# Patient Record
Sex: FEMALE
[De-identification: names, ages, dates, MRNs, and addresses within clinical notes are randomized per-mention and may not be internally consistent; named-entity substitution may affect disease eponyms.]

---

## 2019-06-13 ENCOUNTER — HOSPITAL ENCOUNTER (EMERGENCY)
Dept: HOSPITAL 97 - ER | Age: 59
Discharge: HOME | End: 2019-06-13
Payer: COMMERCIAL

## 2019-06-13 VITALS — DIASTOLIC BLOOD PRESSURE: 80 MMHG | OXYGEN SATURATION: 100 % | SYSTOLIC BLOOD PRESSURE: 137 MMHG

## 2019-06-13 VITALS — TEMPERATURE: 97.4 F

## 2019-06-13 DIAGNOSIS — F41.9: ICD-10-CM

## 2019-06-13 DIAGNOSIS — E06.3: ICD-10-CM

## 2019-06-13 DIAGNOSIS — Z88.1: ICD-10-CM

## 2019-06-13 DIAGNOSIS — Z88.5: ICD-10-CM

## 2019-06-13 DIAGNOSIS — E78.5: ICD-10-CM

## 2019-06-13 DIAGNOSIS — F41.0: Primary | ICD-10-CM

## 2019-06-13 LAB
BUN BLD-MCNC: 15 MG/DL (ref 7–18)
GLUCOSE SERPLBLD-MCNC: 104 MG/DL (ref 74–106)
HCT VFR BLD CALC: 39.9 % (ref 36–45)
INR BLD: 1.05
LYMPHOCYTES # SPEC AUTO: 1.8 K/UL (ref 0.7–4.9)
MAGNESIUM SERPL-MCNC: 2.2 MG/DL (ref 1.8–2.4)
NT-PROBNP SERPL-MCNC: 35 PG/ML (ref ?–125)
PMV BLD: 8.5 FL (ref 7.6–11.3)
POTASSIUM SERPL-SCNC: 3.7 MMOL/L (ref 3.5–5.1)
RBC # BLD: 4.68 M/UL (ref 3.86–4.86)
TSH SERPL DL<=0.05 MIU/L-ACNC: 1.72 UIU/ML (ref 0.36–3.74)

## 2019-06-13 PROCEDURE — 99284 EMERGENCY DEPT VISIT MOD MDM: CPT

## 2019-06-13 PROCEDURE — 85610 PROTHROMBIN TIME: CPT

## 2019-06-13 PROCEDURE — 81003 URINALYSIS AUTO W/O SCOPE: CPT

## 2019-06-13 PROCEDURE — 36415 COLL VENOUS BLD VENIPUNCTURE: CPT

## 2019-06-13 PROCEDURE — 80048 BASIC METABOLIC PNL TOTAL CA: CPT

## 2019-06-13 PROCEDURE — 84439 ASSAY OF FREE THYROXINE: CPT

## 2019-06-13 PROCEDURE — 71045 X-RAY EXAM CHEST 1 VIEW: CPT

## 2019-06-13 PROCEDURE — 85025 COMPLETE CBC W/AUTO DIFF WBC: CPT

## 2019-06-13 PROCEDURE — 85379 FIBRIN DEGRADATION QUANT: CPT

## 2019-06-13 PROCEDURE — 84443 ASSAY THYROID STIM HORMONE: CPT

## 2019-06-13 PROCEDURE — 96374 THER/PROPH/DIAG INJ IV PUSH: CPT

## 2019-06-13 PROCEDURE — 83735 ASSAY OF MAGNESIUM: CPT

## 2019-06-13 PROCEDURE — 93005 ELECTROCARDIOGRAM TRACING: CPT

## 2019-06-13 PROCEDURE — 83880 ASSAY OF NATRIURETIC PEPTIDE: CPT

## 2019-06-13 NOTE — ER
Nurse's Notes                                                                                     

 [facility]                                                                 

Name: Selena Abrams                                                                             

Age: 59 yrs                                                                                       

Sex: Female                                                                                       

: 1960                                                                                   

MRN: [id number]                                                                                   

Arrival Date: 2019                                                                          

Time: 16:03                                                                                       

Account#: [id number]                                                                            

Bed 8                                                                                             

Private MD:                                                                                       

Diagnosis: Panic disorder [episodic paroxysmal anxiety] without agoraphobia                       

                                                                                                  

Presentation:                                                                                     

                                                                                             

16:10 Presenting complaint: Patient states: Three weeks ago hit my chest hard, it was checked ch  

      and everything is ok according to my Doctor. I have had pain since. Today I feel like       

      my heart is racing, my jaw hurts, chest pain. I took 1/2 a xanax and it didn't help.        

      Transition of care: patient was not received from another setting of care. Onset of         

      symptoms. Risk Assessment: Do you want to hurt yourself or someone else? Patient            

      reports no desire to harm self or others. Initial Sepsis Screen: Does the patient meet      

      any 2 criteria? No. Patient's initial sepsis screen is negative. Does the patient have      

      a suspected source of infection? No. Patient's initial sepsis screen is negative. Care      

      prior to arrival: None.                                                                     

16:10 Method Of Arrival: Ambulatory                                                             

16:10 Acuity: JEFERSON 3                                                                           ch  

                                                                                                  

Triage Assessment:                                                                                

16:14 General: Appears uncomfortable, Behavior is anxious.                                      

                                                                                                  

Historical:                                                                                       

- Allergies:                                                                                      

16:14 Codeine;                                                                                  

16:14 Levofloxacin;                                                                             

16:14 Cipro PO;                                                                                 

- Home Meds:                                                                                      

16:14 Xanax 0.5 mg Oral tab 1 tab [Active]; [place] Thyroid Oral [Active]; pravastatin 40 mg   ch  

      Oral tab 1 tab once daily [Active];                                                         

- PMHx:                                                                                           

16:14 Hashimoto's disease; Hyperlipidemia; osteoarthritis; Anxiety;                           ch  

- PSHx:                                                                                           

16:14 knee L; skin graft and jessica fx to legs and collar bone; ectopic, one fallopian tube and  ch  

      ovary removed.;                                                                             

                                                                                                  

- Immunization history:: Adult Immunizations up to date.                                          

- Social history:: Smoking status: Patient/guardian denies using tobacco.                         

- Ebola Screening: : Patient negative for fever greater than or equal to 101.5 degrees            

  Fahrenheit, and additional compatible Ebola Virus Disease symptoms Patient denies               

  exposure to infectious person Patient denies travel to an Ebola-affected area in the            

  21 days before illness onset No symptoms or risks identified at this time.                      

                                                                                                  

                                                                                                  

Screenin:52 Abuse screen: Denies threats or abuse. Denies injuries from another. Nutritional        hb  

      screening: No deficits noted. Tuberculosis screening: No symptoms or risk factors           

      identified. Fall Risk None identified.                                                      

                                                                                                  

Assessment:                                                                                       

16:32 General: Appears in no apparent distress. Behavior is cooperative, anxious, crying.     hb  

      Pain: Pain currently is 8 out of 10 on a pain scale. Neuro: Level of Consciousness is       

      awake, alert, obeys commands, Oriented to person, place, time, situation.                   

      Cardiovascular: Capillary refill < 3 seconds Patient's skin is warm and dry.                

      Respiratory: Airway is patent Respiratory effort is even, unlabored, Respiratory            

      pattern is regular, symmetrical. GI: No signs and/or symptoms were reported involving       

      the gastrointestinal system. : No signs and/or symptoms were reported regarding the       

      genitourinary system. EENT: No signs and/or symptoms were reported regarding the EENT       

      system. Derm: Skin is intact, is healthy with good turgor, Skin is pink, warm \T\ dry.      

      Musculoskeletal: No signs and/or symptoms reported regarding the musculoskeletal system.    

16:54 Reassessment: Pt ambulated to bathroom with steady gait, urine specimen provided.       hb  

      Assisted back to med, on monitor. Bed locked in low position, call light within reach.      

17:31 Reassessment: Patient appears in no apparent distress at this time. Patient and/or      hb  

      family updated on plan of care and expected duration. Pain level reassessed. Patient is     

      alert, oriented x 3, equal unlabored respirations, skin warm/dry/pink.                      

18:15 Reassessment: Patient appears in no apparent distress at this time. Patient and/or      hb  

      family updated on plan of care and expected duration. Pain level reassessed. Patient is     

      alert, oriented x 3, equal unlabored respirations, skin warm/dry/pink. Patient states       

      symptoms have improved.                                                                     

                                                                                                  

Vital Signs:                                                                                      

16:14  / 92; Pulse 113; Resp 20; Temp 97.4; Pulse Ox 99% on R/A; Weight 69.4 kg; Height   

      5 ft. 4 in. (162.56 cm); Pain 8/10;                                                         

17:30  / 80; Pulse 85; Resp 14; Pulse Ox 100% on R/A;                                   hb  

16:14 Body Mass Index 26.26 (69.40 kg, 162.56 cm)                                               

                                                                                                  

ED Course:                                                                                        

16:03 Patient arrived in ED.                                                                  mr  

16:11 Triage completed.                                                                         

16:14 Arm band placed on left wrist. Patient placed in an exam room, on a stretcher.          ch  

16:24 Jenna Gregg, RN is Primary Nurse.                                                   hb  

16:27 Edenilson Silvestre PA is PHCP.                                                               [id number] 

16:27 Troy Trimble MD is Attending Physician.                                             [id number] 

16:45 Patient has correct armband on for positive identification. Placed in gown. Bed in low  hb  

      position. Call light in reach. Side rails up X 1. Cardiac monitor on. Pulse ox on. NIBP     

      on.                                                                                         

16:52 Inserted saline lock: 20 gauge in right antecubital area, using aseptic technique.      hb  

      Blood collected.                                                                            

17:16 XRAY Chest (1 view) In Process Unspecified.                                             EDMS

18:15 No provider procedures requiring assistance completed. IV discontinued, intact,         hb  

      bleeding controlled, No redness/swelling at site. Pressure dressing applied.                

                                                                                                  

Administered Medications:                                                                         

17:37 Drug: Ativan 0.5 mg Route: IVP; Site: right antecubital;                                hb  

18:11 Follow up: Response: No adverse reaction; Anxiety decreased                             hb  

                                                                                                  

                                                                                                  

Outcome:                                                                                          

18:02 Discharge ordered by MD.                                                                [id number] 

18:15 Discharged to home ambulatory.                                                          hb  

18:15 Condition: stable                                                                           

18:15 Discharge instructions given to patient, Instructed on discharge instructions, follow       

      up and referral plans. medication usage, Demonstrated understanding of instructions,        

      follow-up care, medications.                                                                

18:27 Patient left the ED.                                                                    sg  

                                                                                                  

Signatures:                                                                                       

Dispatcher [employer]                           EDMS                                                 

Mandie Aguilar RN RN                                                      

Adin Mcneill RN RN                                                      

Kristy Samuels                                 mr                                                   

Edenilson Silvestre PA PA   jr                                                  

Jenna Gregg RN                     RN   hb                                                   

                                                                                                  

**************************************************************************************************

## 2019-06-13 NOTE — RAD REPORT
EXAM DESCRIPTION:  RAD - Chest Single View - 6/13/2019 5:16 pm

 

CLINICAL HISTORY:  DYSPNEA

Chest pain.

 

COMPARISON:  Chest Pa And Lat (2 Views) dated 5/28/2019; Chest Single View dated 12/29/2016; CHEST SI
NGLE VIEW dated 5/26/2012; CHEST PA AND LAT 2 VIEW dated 3/9/2006

 

FINDINGS:  Portable technique limits examination quality.

 

The lungs are grossly clear. The heart is normal in size. No displaced fractures.

 

IMPRESSION:  No acute intrathoracic process suspected.

## 2019-06-13 NOTE — EDPHYS
Physician Documentation                                                                           

 [facility]                                                                 

Name: Selena Abrams                                                                             

Age: 59 yrs                                                                                       

Sex: Female                                                                                       

: 1960                                                                                   

MRN: [id number]                                                                                   

Arrival Date: 2019                                                                          

Time: 16:03                                                                                       

Account#: [id number]                                                                            

Bed 8                                                                                             

Private MD:                                                                                       

ED Physician Troy Trimble                                                                      

HPI:                                                                                              

                                                                                             

16:58 This 59 yrs old  Female presents to ER via Ambulatory with complaints of        jr8 

      Doesn't Feel Right.                                                                         

16:58 Patient stated that she had chest trauma on [place] day and was seen and evaluated     jr8 

      without acute fracture. Stated that she has been in pain since then but has been            

      managing. Today stated that she started to not feel right. Stated that she feels like       

      her whole body is being electrocuted and is anxious. Stated that she has continued          

      chest pain and shortness of breath now . Severity of symptoms: At their worst the           

      symptoms were moderate in the emergency department the symptoms are unchanged. The          

      patient has not experienced similar symptoms in the past. The patient has been recently     

      seen by a physician:.                                                                       

                                                                                                  

Historical:                                                                                       

- Allergies:                                                                                      

16:14 Codeine;                                                                                ch  

16:14 Levofloxacin;                                                                           ch  

16:14 Cipro PO;                                                                               ch  

- Home Meds:                                                                                      

16:14 Xanax 0.5 mg Oral tab 1 tab [Active]; Prentiss Thyroid Oral [Active]; pravastatin 40 mg   ch  

      Oral tab 1 tab once daily [Active];                                                         

- PMHx:                                                                                           

16:14 Hashimoto's disease; Hyperlipidemia; osteoarthritis; Anxiety;                           ch  

- PSHx:                                                                                           

16:14 knee L; skin graft and jessica fx to legs and collar bone; ectopic, one fallopian tube and  ch  

      ovary removed.;                                                                             

                                                                                                  

- Immunization history:: Adult Immunizations up to date.                                          

- Social history:: Smoking status: Patient/guardian denies using tobacco.                         

- Ebola Screening: : Patient negative for fever greater than or equal to 101.5 degrees            

  Fahrenheit, and additional compatible Ebola Virus Disease symptoms Patient denies               

  exposure to infectious person Patient denies travel to an Ebola-affected area in the            

  21 days before illness onset No symptoms or risks identified at this time.                      

                                                                                                  

                                                                                                  

ROS:                                                                                              

16:58 Eyes: Negative for injury, pain, redness, and discharge, ENT: Negative for injury,      jr8 

      pain, and discharge, Neck: Negative for injury, pain, and swelling, Abdomen/GI:             

      Negative for abdominal pain, nausea, vomiting, diarrhea, and constipation, Back:            

      Negative for injury and pain, MS/Extremity: Negative for injury and deformity, Skin:        

      Negative for injury, rash, and discoloration, Neuro: Negative for headache, weakness,       

      numbness, tingling, and seizure.                                                            

16:58 Cardiovascular: Positive for chest pain, with movement.                                     

16:58 Respiratory: Positive for shortness of breath, Negative for cough, dyspnea on exertion,     

      sputum production, wheezing.                                                                

                                                                                                  

Exam:                                                                                             

16:58 Eyes:  Pupils equal round and reactive to light, extra-ocular motions intact.  Lids and jr8 

      lashes normal.  Conjunctiva and sclera are non-icteric and not injected.  Cornea within     

      normal limits.  Periorbital areas with no swelling, redness, or edema. ENT:  Nares          

      patent. No nasal discharge, no septal abnormalities noted.  Tympanic membranes are          

      normal and external auditory canals are clear.  Oropharynx with no redness, swelling,       

      or masses, exudates, or evidence of obstruction, uvula midline.  Mucous membranes           

      moist. Neck:  Trachea midline, no thyromegaly or masses palpated, and no cervical           

      lymphadenopathy.  Supple, full range of motion without nuchal rigidity, or vertebral        

      point tenderness.  No Meningismus. Chest/axilla:  Normal chest wall appearance and          

      motion.  Nontender with no deformity.  No lesions are appreciated. Cardiovascular:          

      Regular rate and rhythm with a normal S1 and S2.  No gallops, murmurs, or rubs.  Normal     

      PMI, no JVD.  No pulse deficits. Respiratory:  Lungs have equal breath sounds               

      bilaterally, clear to auscultation and percussion.  No rales, rhonchi or wheezes noted.     

       No increased work of breathing, no retractions or nasal flaring. Abdomen/GI:  Soft,        

      non-tender, with normal bowel sounds.  No distension or tympany.  No guarding or            

      rebound.  No evidence of tenderness throughout. Back:  No spinal tenderness.  No            

      costovertebral tenderness.  Full range of motion. Skin:  Warm, dry with normal turgor.      

      Normal color with no rashes, no lesions, and no evidence of cellulitis. MS/ Extremity:      

      Pulses equal, no cyanosis.  Neurovascular intact.  Full, normal range of motion. Neuro:     

       Awake and alert, GCS 15, oriented to person, place, time, and situation.  Cranial          

      nerves II-XII grossly intact.  Motor strength 5/5 in all extremities.  Sensory grossly      

      intact.  Cerebellar exam normal.  Normal gait.                                              

16:58 Psych: Behavior/mood is anxious.                                                            

                                                                                                  

Vital Signs:                                                                                      

16:14  / 92; Pulse 113; Resp 20; Temp 97.4; Pulse Ox 99% on R/A; Weight 69.4 kg; Height ch  

      5 ft. 4 in. (162.56 cm); Pain 8/10;                                                         

17:30  / 80; Pulse 85; Resp 14; Pulse Ox 100% on R/A;                                   hb  

16:14 Body Mass Index 26.26 (69.40 kg, 162.56 cm)                                             [facility]:                                                                                              

16:27 Patient medically screened.                                                             jr8 

18:01 Data reviewed: vital signs, nurses notes, lab test result(s), EKG, radiologic studies,  jr8 

      plain films, and as a result, I will discharge patient. Data interpreted: Pulse             

      oximetry: on room air is 100 %. Interpretation: normal. Counseling: I had a detailed        

      discussion with the patient and/or guardian regarding: the historical points, exam          

      findings, and any diagnostic results supporting the discharge/admit diagnosis, lab          

      results, radiology results, the need for outpatient follow up, a family practitioner,       

      to return to the emergency department if symptoms worsen or persist or if there are any     

      questions or concerns that arise at home. Response to treatment: the patient's symptoms     

      have resolved after treatment.                                                              

18:02 ED course: Discussed with patient if she is having increased anxiety even on her benzo, jr8 

      that it is recommended that she get on an SSRI or SNRI combo and then eventually wean       

      off of her Xanax. Patient agreed and would f/u with PCP after the weekend .                 

                                                                                                  

                                                                                             

16:37 Order name: Basic Metabolic Panel; Complete Time: 17:50                                                                                                                              

16:37 Order name: CBC with Diff; Complete Time: 17:07                                                                                                                                      

16:37 Order name: Magnesium; Complete Time: 17:50                                                                                                                                          

16:37 Order name: NT PRO-BNP; Complete Time: 17:50                                                                                                                                         

16:37 Order name: PT-INR; Complete Time: 17:21                                                                                                                                             

16:37 Order name: TSH; Complete Time: 17:50                                                                                                                                                

16:37 Order name: XRAY Chest (1 view); Complete Time: 17:24                                                                                                                                

16:37 Order name: EKG; Complete Time: 16:41                                                                                                                                                

16:37 Order name: Cardiac monitoring; Complete Time: 16:51                                                                                                                                 

16:37 Order name: EKG - Nurse/Tech; Complete Time: 16:51                                                                                                                                   

16:37 Order name: T4 Free; Complete Time: 17:50                                                                                                                                            

16:37 Order name: DD; Complete Time: 17:21                                                                                                                                                 

17:09 Order name: Urine Dipstick--Ancillary (enter results); Complete Time: 18:05             ms  

                                                                                             

16:37 Order name: IV Saline Lock; Complete Time: 16:52                                                                                                                                     

16:37 Order name: Labs collected and sent; Complete Time: 16:52                                                                                                                            

16:37 Order name: O2 Per Protocol; Complete Time: 16:52                                                                                                                                    

16:37 Order name: O2 Sat Monitoring; Complete Time: 16:52                                      

                                                                                                  

Administered Medications:                                                                         

17:37 Drug: Ativan 0.5 mg Route: IVP; Site: right antecubital;                                hb  

18:11 Follow up: Response: No adverse reaction; Anxiety decreased                             hb  

                                                                                                  

                                                                                                  

Disposition:                                                                                      

19 18:02 Discharged to Home. Impression: Panic disorder [episodic paroxysmal anxiety]       

  without agoraphobia.                                                                            

- Condition is Stable.                                                                            

- Discharge Instructions: Panic Attacks.                                                          

                                                                                                  

- Medication Reconciliation Form, Thank You Letter, Antibiotic Education, Prescription            

  Opioid Use form.                                                                                

- Follow up: Private Physician; When: 2 - 3 days; Reason: Recheck today's complaints,             

  Continuance of care, Re-evaluation by your physician.                                           

- Problem is new.                                                                                 

- Symptoms have improved.                                                                         

                                                                                                  

                                                                                                  

                                                                                                  

Addendum:                                                                                         

2019                                                                                        

     09:53 Co-signature as Attending Physician, Troy Trimble MD I agree with the assessment and  c
ha

           plan of care.                                                                          

                                                                                                  

Signatures:                                                                                       

Dispatcher [employer]                           Mandie Weinberg RN                  Adin Calles ch, RN RN sg Anderson, Corey, MD MD cha Roszak, Josh, PA PA   jr8                                                  

Jenna Gregg RN RN                                                      

                                                                                                  

Corrections: (The following items were deleted from the chart)                                    

                                                                                             

18:27 18:02 2019 18:02 Discharged to Home. Impression: Panic disorder [episodic         sg  

      paroxysmal anxiety] without agoraphobia. Condition is Stable. Forms are Medication          

      Reconciliation Form, Thank You Letter, Antibiotic Education, Prescription Opioid Use.       

      Follow up: Private Physician; When: 2 - 3 days; Reason: Recheck today's complaints,         

      Continuance of care, Re-evaluation by your physician. Problem is new. Symptoms have         

      improved. jr8                                                                               

                                                                                                  

**************************************************************************************************

## 2019-06-14 NOTE — EKG
Test Date:    2019-06-13               Test Time:    16:34:57

Technician:   SALINA                                     

                                                     

MEASUREMENT RESULTS:                                       

Intervals:                                           

Rate:         81                                     

VT:           116                                    

QRSD:         94                                     

QT:           376                                    

QTc:          436                                    

Axis:                                                

P:            77                                     

VT:           116                                    

QRS:          67                                     

T:            67                                     

                                                     

INTERPRETIVE STATEMENTS:                                       

                                                     

Normal sinus rhythm

Normal ECG

Compared to ECG 12/29/2016 17:28:35

No significant changes



Electronically Signed On 06-14-19 08:03:16 CDT by Iker Melendez

## 2019-10-06 ENCOUNTER — HOSPITAL ENCOUNTER (EMERGENCY)
Dept: HOSPITAL 97 - ER | Age: 59
Discharge: HOME | End: 2019-10-06
Payer: COMMERCIAL

## 2019-10-06 VITALS — OXYGEN SATURATION: 100 %

## 2019-10-06 VITALS — SYSTOLIC BLOOD PRESSURE: 118 MMHG | DIASTOLIC BLOOD PRESSURE: 72 MMHG

## 2019-10-06 VITALS — TEMPERATURE: 97.4 F

## 2019-10-06 DIAGNOSIS — E78.5: ICD-10-CM

## 2019-10-06 DIAGNOSIS — F41.9: ICD-10-CM

## 2019-10-06 DIAGNOSIS — Z88.6: ICD-10-CM

## 2019-10-06 DIAGNOSIS — R07.9: Primary | ICD-10-CM

## 2019-10-06 DIAGNOSIS — Z88.1: ICD-10-CM

## 2019-10-06 LAB
ALBUMIN SERPL BCP-MCNC: 4 G/DL (ref 3.4–5)
ALP SERPL-CCNC: 72 U/L (ref 45–117)
ALT SERPL W P-5'-P-CCNC: 16 U/L (ref 12–78)
AST SERPL W P-5'-P-CCNC: 17 U/L (ref 15–37)
BUN BLD-MCNC: 15 MG/DL (ref 7–18)
GLUCOSE SERPLBLD-MCNC: 99 MG/DL (ref 74–106)
HCT VFR BLD CALC: 38.5 % (ref 36–45)
LYMPHOCYTES # SPEC AUTO: 1.3 K/UL (ref 0.7–4.9)
NT-PROBNP SERPL-MCNC: 159 PG/ML (ref ?–125)
PMV BLD: 8.2 FL (ref 7.6–11.3)
POTASSIUM SERPL-SCNC: 3.9 MMOL/L (ref 3.5–5.1)
RBC # BLD: 4.5 M/UL (ref 3.86–4.86)
TROPONIN (EMERG DEPT USE ONLY): < 0.02 NG/ML (ref 0–0.04)
TSH SERPL DL<=0.05 MIU/L-ACNC: 1.36 UIU/ML (ref 0.36–3.74)

## 2019-10-06 PROCEDURE — 83880 ASSAY OF NATRIURETIC PEPTIDE: CPT

## 2019-10-06 PROCEDURE — 71045 X-RAY EXAM CHEST 1 VIEW: CPT

## 2019-10-06 PROCEDURE — 84484 ASSAY OF TROPONIN QUANT: CPT

## 2019-10-06 PROCEDURE — 99285 EMERGENCY DEPT VISIT HI MDM: CPT

## 2019-10-06 PROCEDURE — 84439 ASSAY OF FREE THYROXINE: CPT

## 2019-10-06 PROCEDURE — 80048 BASIC METABOLIC PNL TOTAL CA: CPT

## 2019-10-06 PROCEDURE — 84443 ASSAY THYROID STIM HORMONE: CPT

## 2019-10-06 PROCEDURE — 96360 HYDRATION IV INFUSION INIT: CPT

## 2019-10-06 PROCEDURE — 85025 COMPLETE CBC W/AUTO DIFF WBC: CPT

## 2019-10-06 PROCEDURE — 93005 ELECTROCARDIOGRAM TRACING: CPT

## 2019-10-06 PROCEDURE — 36415 COLL VENOUS BLD VENIPUNCTURE: CPT

## 2019-10-06 PROCEDURE — 80076 HEPATIC FUNCTION PANEL: CPT

## 2019-10-06 NOTE — EDPHYS
Physician Documentation                                                                           

 [facility]                                                                 

Name: Selena Abrams                                                                             

Age: 59 yrs                                                                                       

Sex: Female                                                                                       

: 1960                                                                                   

MRN: [id number]                                                                                   

Arrival Date: 10/06/2019                                                                          

Time: 12:10                                                                                       

Account#: [id number]                                                                            

Bed 7                                                                                             

Private MD:                                                                                       

ED Physician Kashmir Daly                                                                         

HPI:                                                                                              

10/06                                                                                             

12:37 This 59 yrs old  Female presents to ER via Ambulatory with complaints of Chest  rn  

      Pain.                                                                                       

12:37 The patient or guardian reports chest pain that is located primarily in the substernal  rn  

      area. Onset: 2 day(s) ago. The pain radiates to Associated signs and symptoms:              

      Pertinent positives: shortness of breath, Pertinent negatives: abdominal pain, cough,       

      diaphoresis, dizziness, headache, near syncope, palpitations, syncope, vomiting. The        

      chest pain is described as a heaviness, a pressure. Duration: The patient or guardian       

      reports multiple episodes. Modifying factors: The symptoms are alleviated by nothing.       

      the symptoms are aggravated by exertion. Severity of pain: At its worst the pain was        

      mild in the emergency department the pain is unchanged. The patient has not experienced     

      similar symptoms in the past. Reports felt chest tightness Friday, resolved with            

      aspirin at the time, Felt better, but pain returned today. At 0800 when woke up, felt       

      chest tightness, left sided, radiates to jaw and neck. Feels worse with exertion. NO        

      fever/cough. Reports last stress test "years ago". No recent trauma. .                      

                                                                                                  

Historical:                                                                                       

- Allergies:                                                                                      

12:14 Cipro PO;                                                                               aj1 

12:14 Codeine;                                                                                aj1 

12:14 Levofloxacin;                                                                           aj1 

- Home Meds:                                                                                      

12:14 [place] Thyroid Oral [Active]; pravastatin 40 mg Oral tab 1 tab once daily [Active];     aj1 

      Xanax 0.5 mg Oral tab 1 tab [Active];                                                       

- PMHx:                                                                                           

12:14 Anxiety; Hashimoto's disease; Hyperlipidemia; osteoarthritis;                           aj1 

                                                                                                  

- Immunization history:: Flu vaccine is up to date.                                               

- Social history:: Smoking status: Patient/guardian denies using tobacco.                         

- Ebola Screening: : Patient denies travel to an Ebola-affected area in the 21 days               

  before illness onset.                                                                           

- Family history:: not pertinent.                                                                 

- Hospitalizations: : No recent hospitalization is reported.                                      

                                                                                                  

                                                                                                  

ROS:                                                                                              

12:37 Constitutional: Negative for fever, chills, and weight loss, Eyes: Negative for injury, rn  

      pain, redness, and discharge, Neck: Negative for injury, and swelling, Cardiovascular:      

      Negative for edema, + for chest pain Respiratory: Negative for shortness of breath,         

      cough, wheezing, and pleuritic chest pain, Abdomen/GI: Negative for abdominal pain,         

      nausea, vomiting, and constipation, MS/Extremity: Negative for injury and deformity,        

      Skin: Negative for injury, rash, and discoloration, Neuro: Negative for headache,           

      weakness, numbness, tingling, and seizure.                                                  

                                                                                                  

Exam:                                                                                             

12:36 ECG was reviewed by the Attending Physician.                                            rn  

12:37 Constitutional:  This is a well developed, well nourished patient who is awake, alert,  rn  

      seems anxious Head/Face:  Normocephalic, atraumatic. ENT:  MMM Cardiovascular:  Regular     

      rate and rhythm. No pulse deficits. Respiratory:  Lungs have equal breath sounds            

      bilaterally, clear to auscultation.  No increased work of breathing, no retractions or      

      nasal flaring. Abdomen/GI:  soft, non-tender MS/ Extremity:  Pulses equal, no cyanosis.     

       Neurovascular intact.  Full, normal range of motion.  Equal circumference.  +              

      non-pitting edema bilateral lower ext.  Neuro:  Awake and alert, GCS 15, oriented to        

      person, place, time, and situation.  Cranial nerves II-XII grossly intact.  Motor           

      strength 5/5 in all extremities.  Sensory grossly intact.  Cerebellar exam normal.          

      Normal gait.                                                                                

                                                                                                  

Vital Signs:                                                                                      

12:14  / 95; Pulse 88; Resp 18; Temp 97.4; Pulse Ox 99% on R/A; Weight 68.04 kg (R);    aj1 

      Height 5 ft. 4 in. (162.56 cm) (R); Pain 5/10;                                              

13:19  / 84; Pulse 67; Resp 16 S; Pulse Ox 100% on R/A;                                 aa5 

13:29  / 78; Pulse 71; Resp 16 S; Pulse Ox 100% on R/A;                                 aa5 

13:31  / 78; Pulse 77; Resp 16 S; Pulse Ox 100% on R/A;                                 aa5 

13:35  / 75; Pulse 92; Resp 16 S; Pulse Ox 100% on R/A; Pain 4/10;                      aa5 

13:43  / 75; Pulse 70; Resp 16 S; Pulse Ox 100% on R/A;                                 aa5 

14:47  / 72; Pulse 71; Resp 14; Pulse Ox 100% ;                                         ms  

15:42  / 72; Pulse 72; Resp 16; Pulse Ox 100% on R/A;                                   ae4 

16:35  / 70; Pulse 72; Resp 16 S; Pulse Ox 99% on R/A;                                  aa5 

12:14 Body Mass Index 25.75 (68.04 kg, 162.56 cm)                                             aj1 

                                                                                                  

MDM:                                                                                              

12:17 Patient medically screened.                                                             rn  

14:32 Differential diagnosis: acute myocardial infarction, anxiety, coronary artery disease   rn  

      costochondritis, pericarditis, pleurisy, pneumothorax, stable angina, unstable angina.      

      ED course: Patient with neg w/u so far, I recommended admission for ECHO/stress and         

      cardiology consult in case this is cardiac. Patient declines, has discussed case with       

      sister and girlfriend, she would like to go home. i convinced her to stay for repeat        

      trop and ecg, and if normal can go home if she wishes. Understands risks of leaving         

      without admission..                                                                         

15:46 Data reviewed: vital signs, nurses notes, lab test result(s), EKG, radiologic studies,  rn  

      plain films, and as a result, I will admit patient. Counseling: I had a detailed            

      discussion with the patient and/or guardian regarding: the historical points, exam          

      findings, and any diagnostic results supporting the discharge/admit diagnosis, lab          

      results, radiology results, the need for further work-up and treatment in the hospital.     

      Response to treatment: the patient's symptoms have mildly improved after treatment, and     

      as a result, I will. Refusal of service: The patient/guardian displays adequate             

      decision making capability and despite a detailed discussion of alternatives, benefits,     

      risks, and consequences refuses: Admission to the hospital for further work-up and          

      treatment. ED course: Second trop neg, repeat ecg without change. Will dc home per her      

      wishes, states will f/u with cardiology. .                                                  

                                                                                                  

10/06                                                                                             

12:27 Order name: Basic Metabolic Panel; Complete Time: 13:11                                 rn  

10/06                                                                                             

12:27 Order name: CBC with Diff; Complete Time: 13:11                                         rn  

10/06                                                                                             

12:27 Order name: LFT's; Complete Time: 13:11                                                 rn  

10/06                                                                                             

12:27 Order name: NT PRO-BNP; Complete Time: 13:11                                            rn  

10/06                                                                                             

12:27 Order name: Troponin (emerg Dept Use Only); Complete Time: 13:11                        rn  

10/06                                                                                             

12:30 Order name: TSH; Complete Time: 13:11                                                   rn  

10/06                                                                                             

12:27 Order name: XRAY Chest (1 view); Complete Time: 16:18                                   rn  

10/06                                                                                             

12:27 Order name: EKG; Complete Time: 12:28                                                   rn  

10/06                                                                                             

12:30 Order name: T4 Free; Complete Time: 13:11                                               rn  

10/06                                                                                             

14:34 Order name: Troponin (emerg Dept Use Only); Complete Time: 15:45                        rn  

10/06                                                                                             

14:34 Order name: EKG; Complete Time: 14:35                                                   rn  

10/06                                                                                             

12:27 Order name: Cardiac monitoring; Complete Time: 12:36                                    rn  

10/06                                                                                             

12:27 Order name: EKG - Nurse/Tech; Complete Time: 12:36                                      rn  

10/06                                                                                             

12:27 Order name: IV Saline Lock; Complete Time: 12:36                                        rn  

10/06                                                                                             

12:27 Order name: Labs collected and sent; Complete Time: 12:36                               rn  

10/06                                                                                             

12:27 Order name: O2 Per Protocol; Complete Time: 12:36                                       rn  

10/06                                                                                             

12:27 Order name: O2 Sat Monitoring; Complete Time: 12:36                                     rn  

10/06                                                                                             

14:34 Order name: EKG - Nurse/Tech; Complete Time: 14:45                                      rn  

                                                                                                  

EC:36 Rate is 78 beats/min. Rhythm is regular. QRS Axis is Normal. MO interval is normal. QRS rn  

      interval is normal. QT interval is normal. No Q waves. T waves are Normal. No ST            

      changes noted. Clinical impression: NSR w/ Non-specific ST/T Changes. Interpreted by        

      me. Reviewed by me.                                                                         

                                                                                                  

Administered Medications:                                                                         

13:29 Drug: NS 0.9% 500 ml Route: IV; Rate: bolus; Site: right antecubital;                   aa5 

14:00 Follow up: IV Status: Completed infusion; IV Intake: 500ml                              aa5 

13:30 Drug: Nitroglycerin 0.4 mg Route: Sublingual;                                           aa5 

13:40 Follow up: Response: No adverse reaction                                                aa5 

15:36 Drug: Valium 5 mg Route: PO;                                                            aa5 

16:40 Follow up: Response: No adverse reaction; Anxiety decreased                             aa5 

                                                                                                  

                                                                                                  

Disposition:                                                                                      

10/06/19 15:48 Discharged to Home. Impression: Chest pain, unspecified.                           

- Condition is Stable.                                                                            

- Discharge Instructions: Nonspecific Chest Pain.                                                 

                                                                                                  

- Medication Reconciliation Form, Thank You Letter, Antibiotic Education, Prescription            

  Opioid Use form.                                                                                

- Follow up: Tex Aquino MD; When: As needed; Reason: Recheck today's complaints,             

  Re-evaluation by your physician.                                                                

- Problem is new.                                                                                 

- Symptoms have improved.                                                                         

                                                                                                  

                                                                                                  

                                                                                                  

Signatures:                                                                                       

Dispatcher [employer]                           EDMS                                                 

Viridiana Blake RN                     RN   aj1                                                  

Kashmir Daly MD MD rn Calderon, Audri, RN                     RN   aa5                                                  

                                                                                                  

Corrections: (The following items were deleted from the chart)                                    

16:59 15:48 10/06/2019 15:48 Discharged to Home. Impression: Chest pain, unspecified.         aa5 

      Condition is Stable. Forms are Medication Reconciliation Form, Thank You Letter,            

      Antibiotic Education, Prescription Opioid Use. Follow up: Tex Aquino; When: As           

      needed; Reason: Recheck today's complaints, Re-evaluation by your physician. Problem is     

      new. Symptoms have improved. rn                                                             

                                                                                                  

**************************************************************************************************

## 2019-10-06 NOTE — ER
Nurse's Notes                                                                                     

 [facility]                                                                 

Name: Selena Abrams                                                                             

Age: 59 yrs                                                                                       

Sex: Female                                                                                       

: 1960                                                                                   

MRN: [id number]                                                                                   

Arrival Date: 10/06/2019                                                                          

Time: 12:10                                                                                       

Account#: [id number]                                                                            

Bed 7                                                                                             

Private MD:                                                                                       

Diagnosis: Chest pain, unspecified                                                                

                                                                                                  

Presentation:                                                                                     

10/06                                                                                             

12:11 Presenting complaint: Patient states: Chest pain, heaviness in the left breast that     aj1 

      started yesterday. Patient also reports shortness of breath. Patient states that she        

      had a similar episode on Thursday, but she took aspirin and it resolved on its own.         

      Patient reports palpitations yesterday, none today. Transition of care: patient was not     

      received from another setting of care. Onset of symptoms was . Risk Assessment: Do      

      you want to hurt yourself or someone else? Patient reports no desire to harm self or        

      others. Initial Sepsis Screen: Does the patient meet any 2 criteria? No. Patient's          

      initial sepsis screen is negative. Does the patient have a suspected source of              

      infection? No. Patient's initial sepsis screen is negative. Care prior to arrival: None.    

12:11 Method Of Arrival: Ambulatory                                                           aj1 

12:11 Acuity: JEFERSON 3                                                                           aj1 

                                                                                                  

Triage Assessment:                                                                                

12:14 General: Appears in no apparent distress. comfortable, Behavior is calm, cooperative.   aj1 

      Pain: Complains of pain in left breast Pain currently is 5 out of 10 on a pain scale.       

      Neuro: Level of Consciousness is awake, alert, obeys commands. Cardiovascular:              

      Patient's skin is warm and dry. Respiratory: Airway is patent Respiratory effort is         

      even, unlabored, Respiratory pattern is regular, symmetrical.                               

                                                                                                  

Historical:                                                                                       

- Allergies:                                                                                      

12:14 Cipro PO;                                                                               aj1 

12:14 Codeine;                                                                                aj1 

12:14 Levofloxacin;                                                                           aj1 

- Home Meds:                                                                                      

12:14 [place] Thyroid Oral [Active]; pravastatin 40 mg Oral tab 1 tab once daily [Active];     aj1 

      Xanax 0.5 mg Oral tab 1 tab [Active];                                                       

- PMHx:                                                                                           

12:14 Anxiety; Hashimoto's disease; Hyperlipidemia; osteoarthritis;                           aj1 

                                                                                                  

- Immunization history:: Flu vaccine is up to date.                                               

- Social history:: Smoking status: Patient/guardian denies using tobacco.                         

- Ebola Screening: : Patient denies travel to an Ebola-affected area in the 21 days               

  before illness onset.                                                                           

- Family history:: not pertinent.                                                                 

- Hospitalizations: : No recent hospitalization is reported.                                      

                                                                                                  

                                                                                                  

Screenin:30 Abuse screen: Denies threats or abuse. Nutritional screening: No deficits noted.        aa5 

      Tuberculosis screening: No symptoms or risk factors identified. Fall Risk None              

      identified.                                                                                 

                                                                                                  

Assessment:                                                                                       

12:20 General: Appears comfortable, Behavior is calm, cooperative. Pain: Complains of pain in aa5 

      under left breast Pain does not radiate. Pain currently is 5 out of 10 on a pain scale.     

      Quality of pain is described as pressure, Pain began 1 day ago. Is continuous. Neuro:       

      Level of Consciousness is awake, alert, obeys commands, Oriented to person, place,          

      time, situation. Cardiovascular: Heart tones S1 S2 present Edema is absent. Rhythm is       

      sinus rhythm. Respiratory: Reports shortness of breath on exertion Airway is patent         

      Respiratory effort is even, unlabored, Respiratory pattern is regular, symmetrical,         

      Breath sounds are clear bilaterally. GI: Abdomen is flat, non-distended, Bowel sounds       

      present X 4 quads. Abd is soft and non tender X 4 quads. Patient currently denies           

      nausea, vomiting. : No signs and/or symptoms were reported regarding the                  

      genitourinary system. EENT: No signs and/or symptoms were reported regarding the EENT       

      system. Derm: Skin is pink, warm \T\ dry. Musculoskeletal: Range of motion: intact in all   

      extremities.                                                                                

13:19 Reassessment: To bedside to medicate pt. Dr. Daly notified of BP currently 119/84, Dr. tita Daly states to give Nitro and 500cc NS bolus. .                                            

13:19 Reassessment: Patient is alert, oriented x 3, equal unlabored respirations, skin        aa5 

      warm/dry/pink.                                                                              

13:25 Reassessment: Pt requesting to ambulate to the restroom before Nitro administration. Pt aa5 

      ambulatory to restroom with steady gait. .                                                  

13:29 Reassessment: Pt back from restroom.                                                    aa5 

13:43 Reassessment: Patient is alert, oriented x 3, equal unlabored respirations, skin        aa5 

      warm/dry/pink. Pt sitting up in bed. .                                                      

13:43 Pain: Pain currently is 4 out of 10 on a pain scale.                                    aa5 

14:15 Reassessment: Pt speaking on the phone with family at this time. .                      aa5 

14:20 Reassessment: Pt agrees to repeat troponin and EKG at this time. MD was notified. .     aa5 

15:10 Reassessment: Patient is alert, oriented x 3, equal unlabored respirations, skin        aa5 

      warm/dry/pink. Pt c/o anxiety at this time. Pt appears anxious. MD was notified. .          

15:10 Cardiovascular: Rhythm is sinus rhythm.                                                 aa5 

15:43 Reassessment: Patient appears in no apparent distress at this time. Patient and/or      ae4 

      family updated on plan of care and expected duration. Pain level reassessed. Patient        

      states feeling better. Patient states symptoms have improved.                               

16:40 Reassessment: Patient is alert, oriented x 3, equal unlabored respirations, skin        aa5 

      warm/dry/pink. Patient states feeling better.                                               

                                                                                                  

Vital Signs:                                                                                      

12:14  / 95; Pulse 88; Resp 18; Temp 97.4; Pulse Ox 99% on R/A; Weight 68.04 kg (R);    aj1 

      Height 5 ft. 4 in. (162.56 cm) (R); Pain 5/10;                                              

13:19  / 84; Pulse 67; Resp 16 S; Pulse Ox 100% on R/A;                                 aa5 

13:29  / 78; Pulse 71; Resp 16 S; Pulse Ox 100% on R/A;                                 aa5 

13:31  / 78; Pulse 77; Resp 16 S; Pulse Ox 100% on R/A;                                 aa5 

13:35  / 75; Pulse 92; Resp 16 S; Pulse Ox 100% on R/A; Pain 4/10;                      aa5 

13:43  / 75; Pulse 70; Resp 16 S; Pulse Ox 100% on R/A;                                 aa5 

14:47  / 72; Pulse 71; Resp 14; Pulse Ox 100% ;                                         ms  

15:42  / 72; Pulse 72; Resp 16; Pulse Ox 100% on R/A;                                   ae4 

16:35  / 70; Pulse 72; Resp 16 S; Pulse Ox 99% on R/A;                                  aa5 

12:14 Body Mass Index 25.75 (68.04 kg, 162.56 cm)                                             aj1 

                                                                                                  

ED Course:                                                                                        

12:10 Patient arrived in ED.                                                                  as  

12:13 Triage completed.                                                                       aj1 

12:14 Arm band placed on Patient placed in an exam room.                                      aj1 

12:16 Fabiola Kenyon, RN is Primary Nurse.                                                   aa5 

12:17 Kashmir Daly MD is Attending Physician.                                                rn  

12:20 Patient has correct armband on for positive identification. Placed in gown. Bed in low  aa5 

      position. Call light in reach. Side rails up X2. Cardiac monitor on. Pulse ox on. NIBP      

      on.                                                                                         

12:30 Initial lab(s) drawn, by me, sent to lab. Inserted saline lock: 20 gauge in right       aa5 

      antecubital area, using aseptic technique. Blood collected.                                 

12:30 EKG done, by ED staff, reviewed by Kashmir Dlay MD.                                      ms  

12:30 Patient maintains SpO2 saturation greater than 95% on room air.                         aa5 

13:03 XRAY Chest (1 view) In Process Unspecified.                                             EDMS

14:46 EKG done, by ED staff, reviewed by Kashmir Daly MD.                                      ms  

15:48 Tex Aquino MD is Referral Physician.                                               rn  

16:40 No provider procedures requiring assistance completed. IV discontinued, intact,         aa5 

      bleeding controlled, No redness/swelling at site. Pressure dressing applied.                

                                                                                                  

Administered Medications:                                                                         

13:29 Drug: NS 0.9% 500 ml Route: IV; Rate: bolus; Site: right antecubital;                   aa5 

14:00 Follow up: IV Status: Completed infusion; IV Intake: 500ml                              aa5 

13:30 Drug: Nitroglycerin 0.4 mg Route: Sublingual;                                           aa5 

13:40 Follow up: Response: No adverse reaction                                                aa5 

15:36 Drug: Valium 5 mg Route: PO;                                                            aa5 

16:40 Follow up: Response: No adverse reaction; Anxiety decreased                             aa5 

                                                                                                  

                                                                                                  

Intake:                                                                                           

14:00 IV: 500ml; Total: 500ml.                                                                aa5 

                                                                                                  

Outcome:                                                                                          

15:48 Discharge ordered by MD.                                                                rn  

16:40 Discharged to home ambulatory.                                                          aa5 

16:40 Condition: stable                                                                           

16:40 Discharge instructions given to patient, Instructed on discharge instructions, follow       

      up and referral plans. Demonstrated understanding of instructions, follow-up care.          

16:50 Patient left the ED.                                                                    aa5 

                                                                                                  

Signatures:                                                                                       

Dispatcher [employer]                           EDMS                                                 

Viridiana Blake RN                     RN   aj1                                                  

Sneha Thomas Maria                                 ms                                                   

Kashmir Daly MD MD rn Calderon, Audri, RN                     RN   aa5                                                  

Haris Lau RN                     RN   ae4                                                  

                                                                                                  

Corrections: (The following items were deleted from the chart)                                    

13:43 13:35  / 75; Pulse 92bpm; Resp 16bpm; Spontaneous; Pulse Ox 100% RA; aa5          aa5 

18:35 16:59 Patient left the ED. aa5                                                          aa5 

                                                                                                  

**************************************************************************************************

## 2019-10-06 NOTE — RAD REPORT
EXAM DESCRIPTION:  RAD - Chest Single View - 10/6/2019 1:04 pm

 

CLINICAL HISTORY:  Chest pain, shortness of breath

 

COMPARISON:  June 13

 

TECHNIQUE:  AP portable chest image was obtained 1300 hours .

 

FINDINGS:  Lungs are clear. Heart and vasculature are normal. No measurable pleural effusion and no p
neumothorax. No acute bony abnormality seen. No acute aortic findings suspected.

 

IMPRESSION:  No acute cardiopulmonary process.

## 2019-10-07 NOTE — EKG
Test Date:    2019-10-06               Test Time:    14:39:34

Technician:                                       

                                                     

MEASUREMENT RESULTS:                                       

Intervals:                                           

Rate:         69                                     

AR:           126                                    

QRSD:         96                                     

QT:           432                                    

QTc:          462                                    

Axis:                                                

P:            75                                     

AR:           126                                    

QRS:          65                                     

T:            57                                     

                                                     

INTERPRETIVE STATEMENTS:                                       

                                                     

Normal sinus rhythm

normal ECG

Compared to ECG 10/06/2019 12:26:58

No significant changes



Electronically Signed On 10-07-19 09:46:48 CDT by Iker Melendez

## 2019-10-07 NOTE — EKG
Test Date:    2019-10-06               Test Time:    12:26:58

Technician:                                       

                                                     

MEASUREMENT RESULTS:                                       

Intervals:                                           

Rate:         78                                     

WV:           118                                    

QRSD:         92                                     

QT:           404                                    

QTc:          460                                    

Axis:                                                

P:            71                                     

WV:           118                                    

QRS:          66                                     

T:            52                                     

                                                     

INTERPRETIVE STATEMENTS:                                       

                                                     

Normal sinus rhythm

normal ECG

Compared to ECG 06/13/2019 16:34:57

no significant change from previous ECG

Electronically Signed On 10-07-19 09:48:24 CDT by Iker Melendez

## 2019-12-05 ENCOUNTER — HOSPITAL ENCOUNTER (EMERGENCY)
Dept: HOSPITAL 97 - ER | Age: 59
Discharge: HOME | End: 2019-12-05
Payer: COMMERCIAL

## 2019-12-05 VITALS — OXYGEN SATURATION: 100 %

## 2019-12-05 DIAGNOSIS — Z88.3: ICD-10-CM

## 2019-12-05 DIAGNOSIS — Z88.5: ICD-10-CM

## 2019-12-05 DIAGNOSIS — J18.9: Primary | ICD-10-CM

## 2019-12-05 DIAGNOSIS — Z88.1: ICD-10-CM

## 2019-12-05 DIAGNOSIS — E06.3: ICD-10-CM

## 2019-12-05 DIAGNOSIS — E78.5: ICD-10-CM

## 2019-12-05 DIAGNOSIS — F41.9: ICD-10-CM

## 2019-12-05 PROCEDURE — 99284 EMERGENCY DEPT VISIT MOD MDM: CPT

## 2019-12-05 PROCEDURE — 71045 X-RAY EXAM CHEST 1 VIEW: CPT

## 2019-12-05 NOTE — ER
Nurse's Notes                                                                                     

 [facility]                                                                 

Name: Selena Abrams                                                                             

Age: 59 yrs                                                                                       

Sex: Female                                                                                       

: 1960                                                                                   

MRN: [id number]                                                                                   

Arrival Date: 2019                                                                          

Time: 20:36                                                                                       

Account#: [id number]                                                                            

Bed 17                                                                                            

Private MD:                                                                                       

Diagnosis: Pneumonia, unspecified organism                                                        

                                                                                                  

Presentation:                                                                                     

                                                                                             

20:55 Presenting complaint: Patient states: I started getting chills, feet short of breath    aa1 

      and out of it about 2 hours ago. I think I over exerted myself today. Transition of         

      care: patient was not received from another setting of care. Onset of symptoms was          

      2019. Risk Assessment: Do you want to hurt yourself or someone else?           

      Patient reports no desire to harm self or others. Initial Sepsis Screen: Does the           

      patient meet any 2 criteria? No. Patient's initial sepsis screen is negative. Does the      

      patient have a suspected source of infection? No. Patient's initial sepsis screen is        

      negative. Care prior to arrival: Medication(s) given: xanax.                                

20:55 Method Of Arrival: Ambulatory                                                           aa1 

20:55 Acuity: JEFERSON 3                                                                           aa1 

                                                                                                  

Historical:                                                                                       

- Allergies:                                                                                      

20:59 Cipro PO;                                                                               aa1 

20:59 Codeine;                                                                                aa1 

20:59 Levofloxacin;                                                                           aa1 

- Home Meds:                                                                                      

20:59 [place] Thyroid Oral [Active]; Xanax 0.5 mg Oral tab 1 tab [Active];                     aa1 

- PMHx:                                                                                           

20:59 Anxiety; Hashimoto's disease; Hyperlipidemia; osteoarthritis;                           aa1 

- PSHx:                                                                                           

20:59 Knee surgery;                                                                           aa1 

                                                                                                  

- Immunization history:: Adult Immunizations up to date.                                          

- Social history:: Smoking status: Patient/guardian denies using tobacco, never smoked.           

- Ebola Screening: : Patient negative for fever greater than or equal to 101.5 degrees            

  Fahrenheit, and additional compatible Ebola Virus Disease symptoms Patient denies               

  exposure to infectious person Patient denies travel to an Ebola-affected area in the            

  21 days before illness onset.                                                                   

                                                                                                  

                                                                                                  

Screenin:31 Abuse screen: Denies threats or abuse. Denies injuries from another. Nutritional        wh  

      screening: No deficits noted. Tuberculosis screening: No symptoms or risk factors           

      identified. Fall Risk None identified.                                                      

                                                                                                  

Assessment:                                                                                       

21:29 Reassessment: Pt refusing Flu swab, provider notified. General: Appears in no apparent  wh  

      distress. Behavior is calm, cooperative, appropriate for age. Pain: Denies pain. Neuro:     

      Level of Consciousness is awake, alert, obeys commands, Oriented to person, place,          

      time, situation, Appropriate for age. Cardiovascular: Capillary refill < 3 seconds.         

      Respiratory: Airway is patent Respiratory effort is even, unlabored, Respiratory            

      pattern is regular, symmetrical. GI: Abdomen is flat, non-distended. : No signs           

      and/or symptoms were reported regarding the genitourinary system. EENT: No signs and/or     

      symptoms were reported regarding the EENT system. Derm: Skin is intact, is healthy with     

      good turgor, Skin is pink, warm \T\ dry. normal. Musculoskeletal: Circulation, motion,      

      and sensation intact.                                                                       

22:32 Reassessment: Patient appears in no apparent distress at this time. No changes from       

      previously documented assessment. Patient and/or family updated on plan of care and         

      expected duration. Pain level reassessed. Patient is alert, oriented x 3, equal             

      unlabored respirations, skin warm/dry/pink. Provider at bedside explaining POC.             

                                                                                                  

Vital Signs:                                                                                      

21:00  / 100; Pulse 87; Resp 17; Temp 97.8; Pulse Ox 97% ; Weight 68.04 kg; Height 5    aa1 

      ft. 4 in. (162.56 cm); Pain 0/10;                                                           

21:32  / 76; Pulse 75; Resp 18; Pulse Ox 100% on R/A;                                   wh  

22:00  / 78 Supine; Pulse 73; Resp 17; Pulse Ox 100% on R/A;                            mw2 

22:02  / 80 Sitting; Pulse 82; Resp 16; Pulse Ox 100% on R/A;                           mw2 

22:02  / 78 Standing; Pulse 75; Resp 18; Pulse Ox 100% on R/A;                          mw2 

21:00 Body Mass Index 25.75 (68.04 kg, 162.56 cm)                                             aa1 

                                                                                                  

ED Course:                                                                                        

20:36 Patient arrived in ED.                                                                  ds1 

20:58 Triage completed.                                                                       aa1 

21:00 Arm band placed on left wrist.                                                          aa1 

21:01 April Glynn FNP-C is PHCP.                                                        snw 

21:01 Troy Trimble MD is Attending Physician.                                             snw 

21:02 Kelly Gramajo is Primary Nurse.                                                         wh  

21:23 Chest Single View XRAY In Process Unspecified.                                          EDMS

21:31 Patient has correct armband on for positive identification. Bed in low position. Call     

      light in reach. Side rails up X 1. Pulse ox on. NIBP on.                                    

22:33 No provider procedures requiring assistance completed. Patient did not have IV access     

      during this emergency room visit.                                                           

                                                                                                  

Administered Medications:                                                                         

:58 CANCELLED (other intervention used): Augmentin 875 mg PO once                           snw 

22:03 Drug: Zithromax 500 mg Route: PO;                                                         

22:33 Follow up: Response: No adverse reaction                                                  

                                                                                                  

                                                                                                  

Outcome:                                                                                          

:57 Discharge ordered by MD.                                                                snw 

22:33 Discharged to home ambulatory, with family.                                               

22:33 Condition: stable                                                                           

22:33 Discharge instructions given to patient, family, Instructed on discharge instructions,      

      follow up and referral plans. medication usage, POC PNA and IS Demonstrated                 

      understanding of instructions, follow-up care, medications, POC Prescriptions given X 2.    

22:34 Patient left the ED.                                                                      

                                                                                                  

Signatures:                                                                                       

Dispatcher [employer]                           EDMS                                                 

Betty Diallo RN                  RN   aa1                                                  

April Glynn, KATIEP-C                 FNP-Pau Andrea                                ds1                                                  

Kelly Gramajo                                                                                   

Estefani Capellan                            mw2                                                  

                                                                                                  

**************************************************************************************************

## 2019-12-05 NOTE — EDPHYS
Physician Documentation                                                                           

 [facility]                                                                 

Name: Selena Abrams                                                                             

Age: 59 yrs                                                                                       

Sex: Female                                                                                       

: 1960                                                                                   

MRN: [id number]                                                                                   

Arrival Date: 2019                                                                          

Time: 20:36                                                                                       

Account#: [id number]                                                                            

Bed 17                                                                                            

Private MD:                                                                                       

ED Physician Troy Trimble                                                                      

HPI:                                                                                              

                                                                                             

22:33 This 59 yrs old  Female presents to ER via Ambulatory with complaints of Shaky  snw 

      Chills.                                                                                     

22:33 Onset: The symptoms/episode began/occurred suddenly, today. Associated signs and        snw 

      symptoms: Pertinent positives: lower temp, shakiness. Modifying factors: the patient        

      symptoms are aggravated by activity. The patient has experienced similar episodes in        

      the past. oral surgeon.                                                                     

                                                                                                  

Historical:                                                                                       

- Allergies:                                                                                      

20:59 Cipro PO;                                                                               aa1 

20:59 Codeine;                                                                                aa1 

20:59 Levofloxacin;                                                                           aa1 

- Home Meds:                                                                                      

20:59 Orgas Thyroid Oral [Active]; Xanax 0.5 mg Oral tab 1 tab [Active];                     aa1 

- PMHx:                                                                                           

20:59 Anxiety; Hashimoto's disease; Hyperlipidemia; osteoarthritis;                           aa1 

- PSHx:                                                                                           

20:59 Knee surgery;                                                                           aa1 

                                                                                                  

- Immunization history:: Adult Immunizations up to date.                                          

- Social history:: Smoking status: Patient/guardian denies using tobacco, never smoked.           

- Ebola Screening: : Patient negative for fever greater than or equal to 101.5 degrees            

  Fahrenheit, and additional compatible Ebola Virus Disease symptoms Patient denies               

  exposure to infectious person Patient denies travel to an Ebola-affected area in the            

  21 days before illness onset.                                                                   

                                                                                                  

                                                                                                  

ROS:                                                                                              

22:31 Eyes: Negative for injury, pain, redness, and discharge, ENT: Negative for injury,      snw 

      pain, and discharge, Neck: Negative for injury, pain, and swelling, Cardiovascular:         

      Negative for chest pain, palpitations, and edema, Respiratory: Negative for shortness       

      of breath, cough, wheezing, and pleuritic chest pain, Abdomen/GI: Negative for              

      abdominal pain, nausea, vomiting, diarrhea, and constipation, Back: Negative for injury     

      and pain, : Negative for injury, bleeding, discharge, and swelling, MS/Extremity:         

      Negative for injury and deformity, Neuro: Negative for headache, weakness, numbness,        

      tingling, and seizure.                                                                      

22:31 Constitutional: Positive for body aches, chills, malaise.                                   

22:31 Skin: Positive for                                                                          

                                                                                                  

Exam:                                                                                             

22:29 Head/Face:  Normocephalic, atraumatic. Eyes:  Pupils equal round and reactive to light, snw 

      extra-ocular motions intact.  Lids and lashes normal.  Conjunctiva and sclera are           

      non-icteric and not injected.  Cornea within normal limits.  Periorbital areas with no      

      swelling, redness, or edema. ENT:  Nares patent. No nasal discharge, no septal              

      abnormalities noted.  Tympanic membranes are normal and external auditory canals are        

      clear.  Oropharynx with no redness, swelling, or masses, exudates, or evidence of           

      obstruction, uvula midline.  Mucous membranes moist. Recent extraction, biopsy to right     

      mandible, no evidence of infection Neck:  Trachea midline, no thyromegaly or masses         

      palpated, and no cervical lymphadenopathy.  Supple, full range of motion without nuchal     

      rigidity, or vertebral point tenderness.  No Meningismus. Chest/axilla:  Normal chest       

      wall appearance and motion.  Nontender with no deformity.  No lesions are appreciated.      

      Cardiovascular:  Regular rate and rhythm with a normal S1 and S2.  No gallops, murmurs,     

      or rubs.  Normal PMI, no JVD.  No pulse deficits. Respiratory:  Lungs have equal breath     

      sounds bilaterally, clear to auscultation and percussion.  No rales, rhonchi or wheezes     

      noted.  No increased work of breathing, no retractions or nasal flaring. Abdomen/GI:        

      Soft, non-tender, with normal bowel sounds.  No distension or tympany.  No guarding or      

      rebound.  No evidence of tenderness throughout. Back:  No spinal tenderness.  No            

      costovertebral tenderness.  Full range of motion. Skin:  Warm, dry with normal turgor.      

      Normal color with no rashes, no lesions, and no evidence of cellulitis. MS/ Extremity:      

      Pulses equal, no cyanosis.  Neurovascular intact.  Full, normal range of motion. Neuro:     

       Awake and alert, GCS 15, oriented to person, place, time, and situation.  Cranial          

      nerves II-XII grossly intact.  Motor strength 5/5 in all extremities.  Sensory grossly      

      intact.  Cerebellar exam normal.  Normal gait.                                              

22:29 Constitutional: The patient appears alert, awake, anxious.                                  

22:29 Psych: Behavior/mood is anxious, Affect is animated.                                        

                                                                                                  

Vital Signs:                                                                                      

21:00  / 100; Pulse 87; Resp 17; Temp 97.8; Pulse Ox 97% ; Weight 68.04 kg; Height 5    aa1 

      ft. 4 in. (162.56 cm); Pain 0/10;                                                           

21:32  / 76; Pulse 75; Resp 18; Pulse Ox 100% on R/A;                                   wh  

22:00  / 78 Supine; Pulse 73; Resp 17; Pulse Ox 100% on R/A;                            mw2 

22:02  / 80 Sitting; Pulse 82; Resp 16; Pulse Ox 100% on R/A;                           mw2 

22:02  / 78 Standing; Pulse 75; Resp 18; Pulse Ox 100% on R/A;                          mw2 

21:00 Body Mass Index 25.75 (68.04 kg, 162.56 cm)                                             aa1 

                                                                                                  

MDM:                                                                                              

21:03 Patient medically screened.                                                             [facility] 

22:31 Data reviewed: vital signs, nurses notes. Data interpreted: Pulse oximetry: on room air snw 

      is 100 %. Interpretation: normal. Counseling: I had a detailed discussion with the          

      patient and/or guardian regarding: the historical points, exam findings, and any            

      diagnostic results supporting the discharge/admit diagnosis, radiology results, the         

      need for outpatient follow up, to return to the emergency department if symptoms worsen     

      or persist or if there are any questions or concerns that arise at home. Special            

      discussion: Based on the history and exam findings, there is no indication for further      

      emergent testing or inpatient evaluation. I discussed with the patient/guardian the         

      need to see the primary care provider for further evaluation of the symptoms.               

                                                                                                  

                                                                                             

21:05 Order name: Flu                                                                         snw 

                                                                                             

21:05 Order name: Chest Single View XRAY; Complete Time: 21:52                                snw 

                                                                                             

22:21 Order name: INCENTIVE SPIROMETRY                                                        snw 

                                                                                             

21:33 Order name: Orthostatics; Complete Time: 21:57                                          snw 

                                                                                                  

Administered Medications:                                                                         

21:58 CANCELLED (other intervention used): Augmentin 875 mg PO once                           snw 

22:03 Drug: Zithromax 500 mg Route: PO;                                                         

22:33 Follow up: Response: No adverse reaction                                                  

                                                                                                  

                                                                                                  

Disposition:                                                                                      

                                                                                             

07:39 Co-signature as Attending Physician, Troy Trimble MD I agree with the assessment and  [facility] 

      plan of care.                                                                               

                                                                                                  

Disposition:                                                                                      

19 21:57 Discharged to Home. Impression: Pneumonia, unspecified organism.                   

- Condition is Stable.                                                                            

- Discharge Instructions: Community-Acquired Pneumonia, Adult.                                    

- Prescriptions for promethazine 25 mg Oral Tablet - take 1 tablet by ORAL route every            

  6 hours As needed; 15 tablet. Zithromax 500 mg Oral Tablet - take 1 tablet by ORAL              

  route once daily for 5 days; 5 tablet.                                                          

- Medication Reconciliation Form, Thank You Letter, Antibiotic Education, Prescription            

  Opioid Use form.                                                                                

- Follow up: Private Physician; When: 2 - 3 days; Reason: Recheck today's complaints,             

  Continuance of care, Re-evaluation by your physician. Follow up: Emergency                      

  Department; When: As needed; Reason: Worsening of condition.                                    

                                                                                                  

                                                                                                  

                                                                                                  

Signatures:                                                                                       

Dispatcher [employer]                           EDBetty Mosher RN                  RN   aaTroy Naik, April Gallegos MD, cha, FNP-C                 FNP-Kelly Rod                                                   

                                                                                                  

Corrections: (The following items were deleted from the chart)                                    

                                                                                             

21:58 21:56 Augmentin 875 mg PO once ordered. snw                                             snw 

22:34 21:57 2019 21:57 Discharged to Home. Impression: Pneumonia, unspecified organism. wh  

      Condition is Stable. Forms are Medication Reconciliation Form, Thank You Letter,            

      Antibiotic Education, Prescription Opioid Use. Follow up: Private Physician; When: 2 -      

      3 days; Reason: Recheck today's complaints, Continuance of care, Re-evaluation by your      

      physician. Follow up: Emergency Department; When: As needed; Reason: Worsening of           

      condition. snw                                                                              

                                                                                                  

**************************************************************************************************

## 2019-12-05 NOTE — RAD REPORT
EXAM DESCRIPTION:  RAD - Chest Single View - 12/5/2019 9:24 pm

 

CLINICAL HISTORY:  Cough;SOB

Chest pain.

 

COMPARISON:  Chest Single View dated 10/6/2019; Chest Single View dated 6/13/2019; Chest Pa And Lat (
2 Views) dated 5/28/2019; Chest Single View dated 12/29/2016

 

FINDINGS:  Portable technique limits examination quality.

 

Interstitial lung markings are prominent. A small left retroareolar opacity is noted which may repres
ent a small infiltrate/ developing pneumonia or aspiration. The heart is normal in size. No displaced
 fractures.

## 2019-12-06 VITALS — SYSTOLIC BLOOD PRESSURE: 133 MMHG | DIASTOLIC BLOOD PRESSURE: 78 MMHG

## 2019-12-06 VITALS — TEMPERATURE: 99.2 F

## 2020-08-03 ENCOUNTER — HOSPITAL ENCOUNTER (EMERGENCY)
Dept: HOSPITAL 97 - ER | Age: 60
Discharge: HOME | End: 2020-08-03
Payer: COMMERCIAL

## 2020-08-03 VITALS — TEMPERATURE: 98.3 F

## 2020-08-03 VITALS — DIASTOLIC BLOOD PRESSURE: 67 MMHG | SYSTOLIC BLOOD PRESSURE: 107 MMHG

## 2020-08-03 VITALS — OXYGEN SATURATION: 100 %

## 2020-08-03 DIAGNOSIS — E78.5: ICD-10-CM

## 2020-08-03 DIAGNOSIS — E06.3: ICD-10-CM

## 2020-08-03 DIAGNOSIS — Z88.1: ICD-10-CM

## 2020-08-03 DIAGNOSIS — R19.7: Primary | ICD-10-CM

## 2020-08-03 DIAGNOSIS — F41.9: ICD-10-CM

## 2020-08-03 DIAGNOSIS — Z88.5: ICD-10-CM

## 2020-08-03 LAB
ALBUMIN SERPL BCP-MCNC: 3.8 G/DL (ref 3.4–5)
ALP SERPL-CCNC: 67 U/L (ref 45–117)
ALT SERPL W P-5'-P-CCNC: 14 U/L (ref 12–78)
AST SERPL W P-5'-P-CCNC: 12 U/L (ref 15–37)
BUN BLD-MCNC: 9 MG/DL (ref 7–18)
GLUCOSE SERPLBLD-MCNC: 91 MG/DL (ref 74–106)
HCT VFR BLD CALC: 37.7 % (ref 36–45)
LIPASE SERPL-CCNC: 130 U/L (ref 73–393)
LYMPHOCYTES # SPEC AUTO: 2 K/UL (ref 0.7–4.9)
PMV BLD: 9 FL (ref 7.6–11.3)
POTASSIUM SERPL-SCNC: 3.7 MMOL/L (ref 3.5–5.1)
RBC # BLD: 4.38 M/UL (ref 3.86–4.86)

## 2020-08-03 PROCEDURE — 87086 URINE CULTURE/COLONY COUNT: CPT

## 2020-08-03 PROCEDURE — 82565 ASSAY OF CREATININE: CPT

## 2020-08-03 PROCEDURE — 80048 BASIC METABOLIC PNL TOTAL CA: CPT

## 2020-08-03 PROCEDURE — 80076 HEPATIC FUNCTION PANEL: CPT

## 2020-08-03 PROCEDURE — 85025 COMPLETE CBC W/AUTO DIFF WBC: CPT

## 2020-08-03 PROCEDURE — 99284 EMERGENCY DEPT VISIT MOD MDM: CPT

## 2020-08-03 PROCEDURE — 87088 URINE BACTERIA CULTURE: CPT

## 2020-08-03 PROCEDURE — 83690 ASSAY OF LIPASE: CPT

## 2020-08-03 PROCEDURE — 81003 URINALYSIS AUTO W/O SCOPE: CPT

## 2020-08-03 PROCEDURE — 74177 CT ABD & PELVIS W/CONTRAST: CPT

## 2020-08-03 PROCEDURE — 36415 COLL VENOUS BLD VENIPUNCTURE: CPT

## 2020-08-03 NOTE — EDPHYS
Physician Documentation                                                                           

 [facility]                                                                 

Name: Selena Abrams                                                                             

Age: 60 yrs                                                                                       

Sex: Female                                                                                       

: 1960                                                                                   

MRN: [id number]                                                                                   

Arrival Date: 2020                                                                          

Time: 11:22                                                                                       

Account#: [id number]                                                                            

Bed 27                                                                                            

Private MD: Yunior Solano E ED Physician Troy Trimble                                                                      

HPI:                                                                                              

                                                                                             

14:03 This 60 yrs old  Female presents to ER via Ambulatory with complaints of        margaret 

      Abdominal Pain.                                                                             

14:03 The patient presents with abdominal pain right lower quadrant. Onset: The               margaret 

      symptoms/episode began/occurred 1 day(s) ago. The patient presents with pelvic pain.        

      Onset: The symptoms/episode began/occurred 1 day(s) ago. Modifying factors: The             

      symptoms are alleviated by nothing, the symptoms are aggravated by nothing. Associated      

      signs and symptoms: The patient has no apparent associated signs or symptoms. Severity      

      of symptoms: At their worst the symptoms were mild, in the emergency department the         

      symptoms are unchanged. The patient is sexually active, reportedly has a single             

      partner. The symptoms do not radiate.                                                       

                                                                                                  

Historical:                                                                                       

- Allergies:                                                                                      

11:33 Cipro PO;                                                                               ca1 

11:33 Codeine;                                                                                ca1 

11:33 Levofloxacin;                                                                           ca1 

- Home Meds:                                                                                      

11:33 Xanax 0.5 mg Oral tab 1 tab twice a day [Active]; [place] Thyroid 60 mg oral tab daily   ca1 

      [Active];                                                                                   

- PMHx:                                                                                           

11:33 Anxiety; Hashimoto's disease; Hyperlipidemia; osteoarthritis;                           ca1 

- PSHx:                                                                                           

11:33 Knee surgery;                                                                           ca1 

                                                                                                  

- Immunization history:: Adult Immunizations up to date.                                          

- Social history:: Smoking status: Patient denies any tobacco usage or history of.                

                                                                                                  

                                                                                                  

ROS:                                                                                              

14:04 Constitutional: Negative for fever, chills, and weight loss, Eyes: Negative for injury, margaret 

      pain, redness, and discharge, ENT: Negative for injury, pain, and discharge, Neck:          

      Negative for injury, pain, and swelling, Cardiovascular: Negative for chest pain,           

      palpitations, and edema, Respiratory: Negative for shortness of breath, cough,              

      wheezing, and pleuritic chest pain, Back: Negative for injury and pain, : Negative        

      for injury, bleeding, discharge, and swelling, MS/Extremity: Negative for injury and        

      deformity, Skin: Negative for injury, rash, and discoloration, Neuro: Negative for          

      headache, weakness, numbness, tingling, and seizure, Psych: Negative for depression,        

      anxiety, suicide ideation, homicidal ideation, and hallucinations, Allergy/Immunology:      

      Negative for hives, rash, and allergies, Endocrine: Negative for neck swelling,             

      polydipsia, polyuria, polyphagia, and marked weight changes, Hematologic/Lymphatic:         

      Negative for swollen nodes, abnormal bleeding, and unusual bruising.                        

14:04 Abdomen/GI: Positive for abdominal pain, of the anterior aspect of right lateral            

      abdomen and right lower quadrant.                                                           

                                                                                                  

Exam:                                                                                             

14:05 Constitutional:  This is a well developed, well nourished patient who is awake, alert,  margaret 

      and in no acute distress. Head/Face:  Normocephalic, atraumatic. Eyes:  Pupils equal        

      round and reactive to light, extra-ocular motions intact.  Lids and lashes normal.          

      Conjunctiva and sclera are non-icteric and not injected.  Cornea within normal limits.      

      Periorbital areas with no swelling, redness, or edema. ENT:  Nares patent. No nasal         

      discharge, no septal abnormalities noted.  Tympanic membranes are normal and external       

      auditory canals are clear.  Oropharynx with no redness, swelling, or masses, exudates,      

      or evidence of obstruction, uvula midline.  Mucous membranes moist. Neck:  Trachea          

      midline, no thyromegaly or masses palpated, and no cervical lymphadenopathy.  Supple,       

      full range of motion without nuchal rigidity, or vertebral point tenderness.  No            

      Meningismus. Chest/axilla:  Normal chest wall appearance and motion.  Nontender with no     

      deformity.  No lesions are appreciated. Cardiovascular:  Regular rate and rhythm with a     

      normal S1 and S2.  No gallops, murmurs, or rubs.  Normal PMI, no JVD.  No pulse             

      deficits. Respiratory:  Lungs have equal breath sounds bilaterally, clear to                

      auscultation and percussion.  No rales, rhonchi or wheezes noted.  No increased work of     

      breathing, no retractions or nasal flaring. Back:  No spinal tenderness.  No                

      costovertebral tenderness.  Full range of motion. Female :  Normal external               

      genitalia. Skin:  Warm, dry with normal turgor.  Normal color with no rashes, no            

      lesions, and no evidence of cellulitis. MS/ Extremity:  Pulses equal, no cyanosis.          

      Neurovascular intact.  Full, normal range of motion. Neuro:  Awake and alert, GCS 15,       

      oriented to person, place, time, and situation.  Cranial nerves II-XII grossly intact.      

      Motor strength 5/5 in all extremities.  Sensory grossly intact.  Cerebellar exam            

      normal.  Normal gait. Psych:  Awake, alert, with orientation to person, place and time.     

       Behavior, mood, and affect are within normal limits.                                       

14:05 Abdomen/GI: Inspection: abdomen appears normal, Bowel sounds: normal, Palpation: mild       

      abdominal tenderness, in the right lower quadrant.                                          

14:05 Back: pain, that is very mild, ROM is normal, normal spinal alignment noted, CVA            

      tenderness.                                                                                 

14:05 : CVA tenderness, is absent.                                                              

14:05 Musculoskeletal/extremity: Exam is negative for                                             

14:05 Skin: injury, is not appreciated, no rash present.                                          

14:05 Neuro: Exam negative for acute changes, Orientation: is normal, appropriate for stated      

      age, no acute changes, Mentation: is normal, appropriate for stated age, no acute           

      changes, Memory: is normal, appropriate for stated age, no acute changes, Cranial           

      nerves: grossly normal, is grossly normal based on the patient's age, no acute changes,     

      Cerebellar function: is grossly normal, is grossly normal based on the patient's age,       

      no acute changes, Deep tendon reflexes are                                                  

                                                                                                  

Vital Signs:                                                                                      

11:31  / 84; Pulse 85; Resp 16 S; Temp 98.6(O); Pulse Ox 98% on R/A; Weight 63.5 kg     ca1 

      (R); Height 5 ft. 4 in. (162.56 cm) (R); Pain 5/10;                                         

12:26  / 72; Pulse 84; Resp 18; Temp 98.6; Pulse Ox 100% on R/A; Pain 5/10;             ks7 

13:34  / 72; Pulse 72; Resp 16; Pulse Ox 100% ; Pain 0/10;                              ks7 

14:30  / 78; Pulse 73; Resp 16; Temp 98.3(O); Pulse Ox 100% on R/A; Pain 0/10;          ks7 

15:00  / 80; Pulse 62; Resp 16; Pulse Ox 100% on R/A; Pain 0/10;                        ks7 

15:45  / 67; Pulse 64; Resp 18; Pulse Ox 100% on R/A; Pain 0/10;                        ks7 

11:31 Body Mass Index 24.03 (63.50 kg, 162.56 cm)                                             ca1 

                                                                                                  

MDM:                                                                                              

12:27 Patient medically screened.                                                             [facility] 

14:07 Data reviewed: vital signs, nurses notes, lab test result(s), radiologic studies, CT    [facility] 

      scan, plain films.                                                                          

                                                                                                  

                                                                                             

14:02 Order name: Basic Metabolic Panel; Complete Time: 15:32                                 [facility] 

                                                                                             

14:02 Order name: CBC with Diff; Complete Time: 15:32                                         [facility] 

                                                                                             

14:02 Order name: Hepatic Function; Complete Time: 15:32                                      [facility] 

                                                                                             

14:02 Order name: Lipase; Complete Time: 15:32                                                [facility] 

                                                                                             

14:02 Order name: Urine Culture                                                               [facility] 

                                                                                             

14:51 Order name: CREATININE WHOLE BLOOD; Complete Time: 15:32                                [facility]

                                                                                             

14:02 Order name: IV Saline Lock; Complete Time: 14:18                                        [facility] 

                                                                                             

14:02 Order name: CT Abd/Pelvis - IV Contrast Only; Complete Time: 15:32                      [facility] 

                                                                                             

16:54 Order name: Urine Dipstick--Ancillary (enter results)                                     

                                                                                             

14:02 Order name: Labs collected and sent; Complete Time: 14:18                               [facility] 

                                                                                             

14:02 Order name: Urine Dipstick-Ancillary (obtain specimen); Complete Time: 14:18            [facility] 

                                                                                                  

Administered Medications:                                                                         

      Discontinued: NS 0.9% 1000 ml IV at 1 bolus Per protocol; 1000 mL bolus                     

14:56 Not Given (Patient Refused): Dilaudid 0.5 mg IVP once; RASS on ADMIN: Combtv4, Very     ks7 

      Agttd3, Agttd2, Rstlss1, AlertClm0, Drwsy-1, Lt Sdtn-2, Mod Sdtn-3, Dp Sdtn-4,              

      UnArsble-5                                                                                  

14:56 Not Given (Patient Refused): Dilaudid 0.5 mg IVP once; RASS on ADMIN: Combtv4, Very     ks7 

      Agttd3, Agttd2, Rstlss1, AlertClm0, Drwsy-1, Lt Sdtn-2, Mod Sdtn-3, Dp Sdtn-4,              

      UnArsble-5                                                                                  

14:56 Not Given (Patient Refused): Zofran (Ondansetron) 4 mg IVP once; over 2 minutes         ks7 

14:57 Drug: NS 0.9% 1000 ml Route: IV; Rate: 1 bolus; Site: left antecubital;                 ks7 

16:20 Not Given (Patient Refused): Rocephin 1 grams IV at per protocol once; Given slow IV    ks7 

      push per pharmacy instructions                                                              

                                                                                                  

                                                                                                  

Disposition:                                                                                      

20 15:36 Discharged to Home. Impression: Diarrhea, unspecified, Other abdominal pain.       

- Condition is Stable.                                                                            

- Discharge Instructions: Food Choices to Help Relieve Diarrhea, Adult, Diarrhea,                 

  Adult, Diarrhea, Adult, Easy-to-Read.                                                           

- Prescriptions for Bentyl 20 mg Oral Tablet - take 2 tablets by ORAL route every 6               

  hours As needed; 14 tablet. Bactrim - 160 mg Oral Tablet - take 1 tablet by               

  ORAL route every 12 hours for 7 days; 14 tablet.                                                

- Medication Reconciliation Form, Thank You Letter, Antibiotic Education, Prescription            

  Opioid Use, Work release form form.                                                             

- Follow up: Yunior Solano MD; When: 2 - 3 days; Reason: Recheck today's complaints,            

  Continuance of care, Re-evaluation by your physician.                                           

- Problem is new.                                                                                 

- Symptoms have improved.                                                                         

                                                                                                  

                                                                                                  

                                                                                                  

Signatures:                                                                                       

Dispatcher [employer]                           EDMS                                                 

Troy Trimble MD MD cha Acob, Cheryl, RN                        Evon Quiroz RN                  RN   ks7                                                  

                                                                                                  

Corrections: (The following items were deleted from the chart)                                    

17:35 15:36 2020 15:36 Discharged to Home. Impression: Diarrhea, unspecified; Other     ks7 

      abdominal pain. Condition is Stable. Forms are Medication Reconciliation Form, Thank        

      You Letter, Antibiotic Education, Prescription Opioid Use. Follow up: Yunior Solano;        

      When: 2 - 3 days; Reason: Recheck today's complaints, Continuance of care,                  

      Re-evaluation by your physician. Problem is new. Symptoms have improved. margaret                

                                                                                                  

**************************************************************************************************

## 2020-08-03 NOTE — RAD REPORT
EXAM DESCRIPTION:  CT - Abdomen   Pelvis W Contrast - 8/3/2020 2:36 pm

 

CLINICAL HISTORY:  Abdominal pain

 

COMPARISON:  2017

 

TECHNIQUE:  Computed axial tomography of the abdomen pelvis was obtained. 100 cc Isovue-300 was admin
istered intravenously. Oral contrast was not requested which limits evaluation of bowel.

 

All CT scans are performed using dose optimization technique as appropriate and may include automated
 exposure control or mA/KV adjustment according to patient size.

 

FINDINGS:  The liver, spleen, pancreas, adrenal and kidneys appear unremarkable.

 

There is no evidence of diverticulitis. Normal appendix

 

IMPRESSION:  No acute abnormality is displayed.

## 2020-08-03 NOTE — ER
Nurse's Notes                                                                                     

 [facility]                                                                 

Name: Selena Abrams                                                                             

Age: 60 yrs                                                                                       

Sex: Female                                                                                       

: 1960                                                                                   

MRN: [id number]                                                                                   

Arrival Date: 2020                                                                          

Time: 11:22                                                                                       

Account#: [id number]                                                                            

Bed 27                                                                                            

Private MD: Yunior Solano E                                                                     

Diagnosis: Diarrhea, unspecified;Other abdominal pain                                             

                                                                                                  

Presentation:                                                                                     

                                                                                             

11:31 Chief complaint: Patient states: RLQ pain stared 0500 yesterday, intermittent. Diarrhea ca1 

      last night. Denies N/V. Denies Urinary symptoms. Coronavirus screen: Client denies          

      travel out of the [place] in the last 14 days. At this time, the client does not indicate      

      any symptoms associated with coronavirus-19. Ebola Screen: Patient negative for fever       

      greater than or equal to 101.5 degrees Fahrenheit, and additional compatible Ebola          

      Virus Disease symptoms Patient denies exposure to infectious person. Patient denies         

      travel to an Ebola-affected area in the 21 days before illness onset. No symptoms or        

      risks identified at this time. Initial Sepsis Screen: Does the patient meet any 2           

      criteria? No. Patient's initial sepsis screen is negative. Does the patient have a          

      suspected source of infection? No. Patient's initial sepsis screen is negative. Risk        

      Assessment: Do you want to hurt yourself or someone else? Patient reports no desire to      

      harm self or others. Onset of symptoms was 2020.                                 

11:31 Method Of Arrival: Ambulatory                                                           ca1 

11:31 Acuity: JEFERSON 3                                                                           ca1 

                                                                                                  

Historical:                                                                                       

- Allergies:                                                                                      

11:33 Cipro PO;                                                                               ca1 

11:33 Codeine;                                                                                ca1 

11:33 Levofloxacin;                                                                           ca1 

- Home Meds:                                                                                      

11:33 Xanax 0.5 mg Oral tab 1 tab twice a day [Active]; [place] Thyroid 60 mg oral tab daily   ca1 

      [Active];                                                                                   

- PMHx:                                                                                           

11:33 Anxiety; Hashimoto's disease; Hyperlipidemia; osteoarthritis;                           ca1 

- PSHx:                                                                                           

11:33 Knee surgery;                                                                           ca1 

                                                                                                  

- Immunization history:: Adult Immunizations up to date.                                          

- Social history:: Smoking status: Patient denies any tobacco usage or history of.                

                                                                                                  

                                                                                                  

Screenin:26 Abuse screen: Denies threats or abuse. Denies injuries from another. Nutritional        ks7 

      screening: No deficits noted. Nutritional screening: No deficits noted. Tuberculosis        

      screening: No symptoms or risk factors identified. Fall Risk None identified.               

                                                                                                  

Assessment:                                                                                       

12:26 Reassessment: Patient is alert, oriented x 3, equal unlabored respirations, skin        ks7 

      warm/dry/pink. General: Appears uncomfortable, Behavior is calm, cooperative. Pain:         

      Complains of pain in abdomen, RLQ pain Pain does not radiate. Pain currently is 0 out       

      of 10 on a pain scale. at worst was 8 out of 10 on a pain scale. Quality of pain is         

      described as sharp, Pain began 1 day ago. Is intermittent. GI: Bowel sounds present X 4     

      quads. Abd is soft and non tender no pain on palpation. intermittent sharp pain Abd is      

      soft X 4 quads.                                                                             

15:12 Reassessment: Patient is alert, oriented x 3, equal unlabored respirations, skin        ks7 

      warm/dry/pink.                                                                              

16:02 Reassessment: pt ambulated to bathroom steady on feet.                                  ks7 

16:20 Reassessment: pt refused IV antibiotics d/t her hypersensitivity reaction to            ks7 

      medications. MD notified. will come talk to pt before dc.                                   

16:40 Reassessment: DC on hold until MD comes to bedside to talk to pt, discuss antibiotics   ks7 

      and dc plan of care.                                                                        

                                                                                                  

Vital Signs:                                                                                      

11:31  / 84; Pulse 85; Resp 16 S; Temp 98.6(O); Pulse Ox 98% on R/A; Weight 63.5 kg     ca1 

      (R); Height 5 ft. 4 in. (162.56 cm) (R); Pain 5/10;                                         

12:26  / 72; Pulse 84; Resp 18; Temp 98.6; Pulse Ox 100% on R/A; Pain 5/10;             ks7 

13:34  / 72; Pulse 72; Resp 16; Pulse Ox 100% ; Pain 0/10;                              ks7 

14:30  / 78; Pulse 73; Resp 16; Temp 98.3(O); Pulse Ox 100% on R/A; Pain 0/10;          ks7 

15:00  / 80; Pulse 62; Resp 16; Pulse Ox 100% on R/A; Pain 0/10;                        ks7 

15:45  / 67; Pulse 64; Resp 18; Pulse Ox 100% on R/A; Pain 0/10;                        ks7 

11:31 Body Mass Index 24.03 (63.50 kg, 162.56 cm)                                             ca1 

                                                                                                  

ED Course:                                                                                        

11:22 Patient arrived in ED.                                                                  ag5 

11:22 Yunior Solano MD is Private Physician.                                                ag5 

11:32 Triage completed.                                                                       ca1 

11:33 Arm band placed on right wrist.                                                         ca1 

11:33 Patient has correct armband on for positive identification. Placed in gown. Bed in low  ca1 

      position. Call light in reach. Side rails up X 1. Pulse ox on. NIBP on. Warm blanket        

      given.                                                                                      

12:04 Evon Donohue, RN is Primary Nurse.                                                ks7 

12:26 Resting quietly.                                                                        ks7 

12:26 No provider procedures requiring assistance completed.                                  ks7 

12:27 Troy Trimble MD is Attending Physician.                                             [facility] 

13:34 Inserted saline lock: 20 gauge in left antecubital area, using aseptic technique. Blood ks7 

      collected.                                                                                  

14:36 CT Abd/Pelvis - IV Contrast Only In Process Unspecified.                                EDMS

14:46 Urine Culture Sent.                                                                     ks7 

15:35 Yunior Solano MD is Referral Physician.                                               [facility] 

15:47 warm blanket given to pt.                                                               ks7 

17:34 IV discontinued, intact, bleeding controlled, No redness/swelling at site. Pressure     ks7 

      dressing applied.                                                                           

                                                                                                  

Administered Medications:                                                                         

      Discontinued: NS 0.9% 1000 ml IV at 1 bolus Per protocol; 1000 mL bolus                     

14:56 Not Given (Patient Refused): Dilaudid 0.5 mg IVP once; RASS on ADMIN: Combtv4, Very     ks7 

      Agttd3, Agttd2, Rstlss1, AlertClm0, Drwsy-1, Lt Sdtn-2, Mod Sdtn-3, Dp Sdtn-4,              

      UnArsble-5                                                                                  

14:56 Not Given (Patient Refused): Dilaudid 0.5 mg IVP once; RASS on ADMIN: Combtv4, Very     ks7 

      Agttd3, Agttd2, Rstlss1, AlertClm0, Drwsy-1, Lt Sdtn-2, Mod Sdtn-3, Dp Sdtn-4,              

      UnArsble-5                                                                                  

14:56 Not Given (Patient Refused): Zofran (Ondansetron) 4 mg IVP once; over 2 minutes         ks7 

14:57 Drug: NS 0.9% 1000 ml Route: IV; Rate: 1 bolus; Site: left antecubital;                 ks7 

16:20 Not Given (Patient Refused): Rocephin 1 grams IV at per protocol once; Given slow IV    ks7 

      push per pharmacy instructions                                                              

                                                                                                  

                                                                                                  

Outcome:                                                                                          

15:36 Discharge ordered by MD. robles 

17:34 Discharged to home ambulatory.                                                          ks7 

17:34 Condition: good                                                                             

17:34 Discharge instructions given to patient, Instructed on discharge instructions,              

      medication usage, Demonstrated understanding of instructions, medications,                  

      Prescriptions given X 2.                                                                    

17:35 Patient left the ED.                                                                    ks7 

                                                                                                  

Signatures:                                                                                       

Dispatcher [employer]                           EDMS                                                 

Troy Trimble MD MD cha Acob, Cheryl, RN                        RN   ca1                                                  

Luis E Tam                                5                                                  

Evon Donohue RN                  RN   ks7                                                  

                                                                                                  

Corrections: (The following items were deleted from the chart)                                    

15:12 14:57  / 78; Pulse 73bpm; Resp 16bpm; Pulse Ox 100% RA; Temp 98.3F Oral; Pain     ks7 

      0/10; ks7                                                                                   

16:19 16:03 Rocephin 1 grams IV at per protocol in left antecubital ks7                       ks7 

                                                                                                  

**************************************************************************************************